# Patient Record
Sex: MALE | Race: BLACK OR AFRICAN AMERICAN | NOT HISPANIC OR LATINO | Employment: OTHER | URBAN - NONMETROPOLITAN AREA
[De-identification: names, ages, dates, MRNs, and addresses within clinical notes are randomized per-mention and may not be internally consistent; named-entity substitution may affect disease eponyms.]

---

## 2022-10-28 ENCOUNTER — DOCUMENTATION (OUTPATIENT)
Dept: FAMILY MEDICINE CLINIC | Facility: CLINIC | Age: 67
End: 2022-10-28

## 2022-10-28 PROBLEM — E11.9 TYPE 2 DIABETES MELLITUS WITHOUT COMPLICATIONS: Status: ACTIVE | Noted: 2022-10-28

## 2022-10-28 PROBLEM — M62.81 MUSCLE WEAKNESS (GENERALIZED): Status: ACTIVE | Noted: 2022-10-28

## 2022-10-28 PROBLEM — F03.90 UNSPECIFIED DEMENTIA, UNSPECIFIED SEVERITY, WITHOUT BEHAVIORAL DISTURBANCE, PSYCHOTIC DISTURBANCE, MOOD DISTURBANCE, AND ANXIETY: Status: ACTIVE | Noted: 2022-10-28

## 2022-10-28 PROBLEM — S06.5X9A: Status: ACTIVE | Noted: 2022-10-28

## 2022-10-28 PROBLEM — I69.354 HEMIPLEGIA AND HEMIPARESIS FOLLOWING CEREBRAL INFARCTION AFFECTING LEFT NON-DOMINANT SIDE: Status: ACTIVE | Noted: 2022-10-28

## 2022-10-28 PROBLEM — K59.00 CONSTIPATION, UNSPECIFIED: Status: ACTIVE | Noted: 2022-10-28

## 2022-10-28 PROBLEM — G93.40 ENCEPHALOPATHY: Status: ACTIVE | Noted: 2022-10-28

## 2022-10-28 PROBLEM — Z86.73 PERSONAL HISTORY OF TRANSIENT ISCHEMIC ATTACK (TIA), AND CEREBRAL INFARCTION WITHOUT RESIDUAL DEFICITS: Status: ACTIVE | Noted: 2022-10-28

## 2022-10-28 PROBLEM — S09.90XD UNSPECIFIED INJURY OF HEAD, SUBSEQUENT ENCOUNTER: Status: ACTIVE | Noted: 2022-10-28

## 2022-10-28 PROBLEM — M62.422 CONTRACTURE OF MUSCLE, LEFT UPPER ARM: Status: ACTIVE | Noted: 2022-10-28

## 2022-10-28 PROBLEM — R26.81 UNSTEADINESS ON FEET: Status: ACTIVE | Noted: 2022-10-28

## 2022-10-28 PROBLEM — E78.5 HYPERLIPIDEMIA: Status: ACTIVE | Noted: 2022-10-28

## 2022-10-28 PROBLEM — E46 UNSPECIFIED PROTEIN-CALORIE MALNUTRITION: Status: ACTIVE | Noted: 2022-10-28

## 2022-10-28 PROBLEM — R55 SYNCOPE AND COLLAPSE: Status: ACTIVE | Noted: 2022-10-28

## 2022-10-28 RX ORDER — PREGABALIN 75 MG/1
75 CAPSULE ORAL 2 TIMES DAILY
COMMUNITY
End: 2022-10-28 | Stop reason: SDUPTHER

## 2022-10-28 RX ORDER — CARVEDILOL 3.12 MG/1
3.12 TABLET ORAL 2 TIMES DAILY WITH MEALS
COMMUNITY

## 2022-10-28 RX ORDER — SENNA PLUS 8.6 MG/1
2 TABLET ORAL DAILY
COMMUNITY

## 2022-10-28 RX ORDER — ATORVASTATIN CALCIUM 80 MG/1
80 TABLET, FILM COATED ORAL DAILY
COMMUNITY

## 2022-10-28 RX ORDER — PREGABALIN 75 MG/1
75 CAPSULE ORAL 2 TIMES DAILY
Qty: 60 CAPSULE | Refills: 3 | Status: SHIPPED | OUTPATIENT
Start: 2022-10-28 | End: 2022-12-28 | Stop reason: SDUPTHER

## 2022-10-28 RX ORDER — VALSARTAN 80 MG/1
80 TABLET ORAL DAILY
COMMUNITY

## 2022-10-28 RX ORDER — INSULIN GLARGINE 100 [IU]/ML
5 INJECTION, SOLUTION SUBCUTANEOUS 2 TIMES DAILY
COMMUNITY

## 2022-10-28 RX ORDER — AMLODIPINE BESYLATE 10 MG/1
10 TABLET ORAL DAILY
COMMUNITY

## 2022-10-28 NOTE — TELEPHONE ENCOUNTER
(NEW ADMIT)    [facility] REQUESTING MED REFILL FOR LYRICA 75 MG.    DIRECTIONS: LYRICA 75 MG 1 CAPSULE PO BID.

## 2022-11-03 ENCOUNTER — NURSING HOME (OUTPATIENT)
Dept: INTERNAL MEDICINE | Facility: CLINIC | Age: 67
End: 2022-11-03

## 2022-11-03 VITALS
DIASTOLIC BLOOD PRESSURE: 78 MMHG | TEMPERATURE: 97.6 F | SYSTOLIC BLOOD PRESSURE: 133 MMHG | RESPIRATION RATE: 18 BRPM | OXYGEN SATURATION: 95 % | WEIGHT: 104 LBS | HEART RATE: 77 BPM

## 2022-11-03 DIAGNOSIS — R26.81 UNSTEADINESS ON FEET: ICD-10-CM

## 2022-11-03 DIAGNOSIS — G93.40 ENCEPHALOPATHY: ICD-10-CM

## 2022-11-03 DIAGNOSIS — M62.81 MUSCLE WEAKNESS (GENERALIZED): ICD-10-CM

## 2022-11-03 DIAGNOSIS — S09.90XD UNSPECIFIED INJURY OF HEAD, SUBSEQUENT ENCOUNTER: ICD-10-CM

## 2022-11-03 DIAGNOSIS — S06.5X9D TRAUMATIC SUBDURAL HEMORRHAGE WITH LOSS OF CONSCIOUSNESS, SUBSEQUENT ENCOUNTER: Primary | ICD-10-CM

## 2022-11-03 DIAGNOSIS — I69.354 HEMIPLEGIA AND HEMIPARESIS FOLLOWING CEREBRAL INFARCTION AFFECTING LEFT NON-DOMINANT SIDE: ICD-10-CM

## 2022-11-03 DIAGNOSIS — E11.9 TYPE 2 DIABETES MELLITUS WITHOUT COMPLICATION, WITH LONG-TERM CURRENT USE OF INSULIN: ICD-10-CM

## 2022-11-03 DIAGNOSIS — Z79.4 TYPE 2 DIABETES MELLITUS WITHOUT COMPLICATION, WITH LONG-TERM CURRENT USE OF INSULIN: ICD-10-CM

## 2022-11-03 DIAGNOSIS — M62.422 CONTRACTURE OF MUSCLE, LEFT UPPER ARM: ICD-10-CM

## 2022-11-03 PROCEDURE — 99305 1ST NF CARE MODERATE MDM 35: CPT | Performed by: INTERNAL MEDICINE

## 2022-11-11 NOTE — PROGRESS NOTES
Nursing Home History and Physical       Natan Ovalle DO  [address] [place] [address] Phone: [phone]  Fax: [phone]     PATIENT NAME: Sher Colindres                                                                          YOB: 1955           DATE OF SERVICE: 11/03/2022  FACILITY:  [facility]    CHIEF COMPLAINT:  Nursing facility admission      HISTORY OF PRESENT ILLNESS:   Patient is a 67-year-old white male with a history of hypertension, hyperlipidemia, dementia, and type 2 diabetes mellitus who was recently hospitalized at [facility] after suffering a mechanical fall from toilet resulting in subdural hematoma.  He already has a known history of left-sided hemiplegia and had been taking aspirin.  Patient was noted to have subdural hematoma on CT imaging which held stable.  Patient was discharged to nursing facility for further rehab and strengthening.    Patient was resting comfortably in his bed with no new complaints.  Neurologic changes.  Nurses report appropriate mood and behaviors.  Left-sided weakness is chronic and persists.  Patient has been working with physical therapy as best as he can.  Patient is due to have his aspirin restarted per neurology recommendations.    PAST MEDICAL & SURGICAL HISTORY:   Past Medical History:   Diagnosis Date   • Diabetes mellitus (HCC)    • Hypertension    • Stroke (HCC)       No past surgical history on file.      MEDICATIONS:  I have reviewed and reconciled the patients medication list in the patients chart at the skilled nursing facility on 11/03/2022.      ALLERGIES:  No Known Allergies      SOCIAL HISTORY:  Social History     Socioeconomic History   • Marital status:    Tobacco Use   • Smoking status: Unknown   Substance and Sexual Activity   • Drug use: Never   • Sexual activity: Defer       FAMILY HISTORY:  Family History   Family history unknown: Yes        REVIEW OF SYSTEMS:  Review of Systems    Constitutional: Negative for chills, fatigue and fever.   HENT: Negative for congestion, ear pain, rhinorrhea, sinus pressure and sore throat.    Eyes: Negative for visual disturbance.   Respiratory: Negative for cough, chest tightness, shortness of breath and wheezing.    Cardiovascular: Negative for chest pain, palpitations and leg swelling.   Gastrointestinal: Negative for abdominal pain, blood in stool, constipation, diarrhea, nausea and vomiting.   Endocrine: Negative for polydipsia and polyuria.   Genitourinary: Negative for dysuria and hematuria.   Musculoskeletal: Negative for arthralgias and back pain.   Skin: Negative for rash.   Neurological: Negative for dizziness, light-headedness, numbness and headaches.   Psychiatric/Behavioral: Negative for dysphoric mood and sleep disturbance. The patient is not nervous/anxious.          PHYSICAL EXAMINATION:   VITAL SIGNS: /78   Pulse 77   Temp 97.6 °F (36.4 °C)   Resp 18   Wt 47.2 kg (104 lb)   SpO2 95%     Physical Exam  Vitals and nursing note reviewed.   Constitutional:       Appearance: Normal appearance. He is well-developed.   HENT:      Head: Normocephalic and atraumatic.      Right Ear: Tympanic membrane and external ear normal.      Left Ear: Tympanic membrane and external ear normal.      Nose: Nose normal. No congestion.      Mouth/Throat:      Mouth: Mucous membranes are moist.      Pharynx: No oropharyngeal exudate.   Eyes:      General: No scleral icterus.        Right eye: No discharge.      Pupils: Pupils are equal, round, and reactive to light.   Neck:      Thyroid: No thyromegaly.      Vascular: No JVD.   Cardiovascular:      Rate and Rhythm: Normal rate and regular rhythm.      Heart sounds: Normal heart sounds. No murmur heard.    No friction rub.   Pulmonary:      Effort: Pulmonary effort is normal. No respiratory distress.      Breath sounds: Normal breath sounds. No stridor. No wheezing.   Abdominal:      General: Bowel sounds  are normal. There is no distension.      Palpations: Abdomen is soft.      Tenderness: There is no abdominal tenderness. There is no guarding.   Genitourinary:     Comments: Deferred  Musculoskeletal:         General: No tenderness. Normal range of motion.      Cervical back: Normal range of motion and neck supple.   Lymphadenopathy:      Cervical: No cervical adenopathy.   Skin:     General: Skin is warm and dry.      Findings: No rash.   Neurological:      Mental Status: He is alert and oriented to person, place, and time.      Cranial Nerves: No cranial nerve deficit.   Psychiatric:         Mood and Affect: Mood normal.         Behavior: Behavior normal.         Thought Content: Thought content normal.         RECORDS REVIEW:   Discharge Summary from [facility] 10/27/2022    ASSESSMENT   Diagnoses and all orders for this visit:    1. Traumatic subdural hemorrhage with loss of consciousness, subsequent encounter (Primary)    2. Encephalopathy    3. Unsteadiness on feet    4. Muscle weakness (generalized)    5. Contracture of muscle, left upper arm    6. Unspecified injury of head, subsequent encounter    7. Hemiplegia and hemiparesis following cerebral infarction affecting left non-dominant side (HCC)    8. Type 2 diabetes mellitus without complication, with long-term current use of insulin (HCC)        PLAN  Traumatic subdural hematoma  - Stable.  Aspirin was on hold however per neurosurgery recommendations, patient can have this restarted today.  Orders have been placed to restart aspirin 81 mg daily.  - No new neurologic changes.    Encephalopathy/unsteadiness  - Continue physical therapy for strengthening.    History of left-sided weakness secondary to CVA  - Stable, chronic issue.    Type 2 diabetes mellitus  - Continue insulin regimen.  Glucose levels have been reasonable in the facility so far since admission.        [x]  Discussed Patient in detail with nursing/staff, addressed all needs today.     [x]   Plan of Care Reviewed   [x]  PT/OT Reviewed   [x]  Order Changes  []  Discharge Plans Reviewed  [x]  Advance Directive on file with Nursing Home.   [x]  POA on file with Nursing Home.    [x]  Code Status listed and reviewed.         Natan Ovalle DO.  11/11/2022      **Part of this note may be an electronic transcription/translation of spoken language to printed text using the Dragon Dictation System.**

## 2022-12-15 ENCOUNTER — NURSING HOME (OUTPATIENT)
Dept: INTERNAL MEDICINE | Facility: CLINIC | Age: 67
End: 2022-12-15

## 2022-12-15 VITALS
HEART RATE: 80 BPM | SYSTOLIC BLOOD PRESSURE: 134 MMHG | TEMPERATURE: 97 F | WEIGHT: 124 LBS | RESPIRATION RATE: 18 BRPM | DIASTOLIC BLOOD PRESSURE: 78 MMHG | OXYGEN SATURATION: 97 %

## 2022-12-15 DIAGNOSIS — R26.81 UNSTEADINESS ON FEET: ICD-10-CM

## 2022-12-15 DIAGNOSIS — S09.90XD UNSPECIFIED INJURY OF HEAD, SUBSEQUENT ENCOUNTER: ICD-10-CM

## 2022-12-15 DIAGNOSIS — M62.81 MUSCLE WEAKNESS (GENERALIZED): ICD-10-CM

## 2022-12-15 DIAGNOSIS — E11.9 TYPE 2 DIABETES MELLITUS WITHOUT COMPLICATION, WITH LONG-TERM CURRENT USE OF INSULIN: ICD-10-CM

## 2022-12-15 DIAGNOSIS — Z79.4 TYPE 2 DIABETES MELLITUS WITHOUT COMPLICATION, WITH LONG-TERM CURRENT USE OF INSULIN: ICD-10-CM

## 2022-12-15 DIAGNOSIS — I69.354 HEMIPLEGIA AND HEMIPARESIS FOLLOWING CEREBRAL INFARCTION AFFECTING LEFT NON-DOMINANT SIDE: ICD-10-CM

## 2022-12-15 DIAGNOSIS — S06.5X9D TRAUMATIC SUBDURAL HEMORRHAGE WITH LOSS OF CONSCIOUSNESS, SUBSEQUENT ENCOUNTER: Primary | ICD-10-CM

## 2022-12-15 DIAGNOSIS — G93.40 ENCEPHALOPATHY: ICD-10-CM

## 2022-12-15 DIAGNOSIS — M62.422 CONTRACTURE OF MUSCLE, LEFT UPPER ARM: ICD-10-CM

## 2022-12-15 PROCEDURE — 99309 SBSQ NF CARE MODERATE MDM 30: CPT | Performed by: INTERNAL MEDICINE

## 2022-12-19 NOTE — PROGRESS NOTES
Nursing Home Progress Note        Natan Vasquez,    [address] [place] [address] Phone: [phone]  Fax: [phone]     PATIENT NAME: Sher Colindres                                                                          YOB: 1955           DATE OF SERVICE: 12/15/2022  FACILITY:   [facility] ______________________________________________________________________     CHIEF COMPLAINT:  Chronic Medical Management      HISTORY OF PRESENT ILLNESS:   Patient has physically been doing better since he has been admitted to this facility over the last month.  He continuously complains about various concerns.  Today he mentioned that he was having right shoulder and neck pain (his good arm).  His left arm continues to be contracted and at times also bothers him.  He requested evaluation of the right arm with xray.  He does not recall injury.  Glucose levels have been modestly elevated.     PAST MEDICAL & SURGICAL HISTORY:   Past Medical History:   Diagnosis Date   • Diabetes mellitus (HCC)    • Hypertension    • Stroke (HCC)       No past surgical history on file.      MEDICATIONS:  I have reviewed and reconciled the patients medication list in the patients chart at the skilled nursing facility today, 12/15/2022.      ALLERGIES:  No Known Allergies      SOCIAL HISTORY:  Social History     Socioeconomic History   • Marital status:    Tobacco Use   • Smoking status: Unknown   Substance and Sexual Activity   • Drug use: Never   • Sexual activity: Defer       FAMILY HISTORY:  Family History   Family history unknown: Yes       REVIEW OF SYSTEMS:  Review of Systems   Constitutional: Negative for chills, fatigue and fever.   HENT: Negative for congestion, ear pain, rhinorrhea, sinus pressure and sore throat.    Eyes: Negative for visual disturbance.   Respiratory: Negative for cough, chest tightness, shortness of breath and wheezing.    Cardiovascular: Negative for chest pain,  palpitations and leg swelling.   Gastrointestinal: Negative for abdominal pain, blood in stool, constipation, diarrhea, nausea and vomiting.   Endocrine: Negative for polydipsia and polyuria.   Genitourinary: Negative for dysuria and hematuria.   Musculoskeletal: Positive for arthralgias and neck pain. Negative for back pain.   Skin: Negative for rash.   Neurological: Negative for dizziness, light-headedness, numbness and headaches.   Psychiatric/Behavioral: Negative for dysphoric mood and sleep disturbance. The patient is not nervous/anxious.           PHYSICAL EXAMINATION:     VITAL SIGNS:  /78   Pulse 80   Temp 97 °F (36.1 °C)   Resp 18   Wt 56.2 kg (124 lb)   SpO2 97%     Physical Exam  Vitals and nursing note reviewed.   Constitutional:       Appearance: Normal appearance. He is well-developed.   HENT:      Head: Normocephalic and atraumatic.      Right Ear: Tympanic membrane and external ear normal.      Left Ear: Tympanic membrane and external ear normal.      Nose: Nose normal. No congestion.      Mouth/Throat:      Mouth: Mucous membranes are moist.      Pharynx: No oropharyngeal exudate.   Eyes:      General: No scleral icterus.        Right eye: No discharge.      Pupils: Pupils are equal, round, and reactive to light.   Neck:      Thyroid: No thyromegaly.      Vascular: No JVD.   Cardiovascular:      Rate and Rhythm: Normal rate and regular rhythm.      Heart sounds: Normal heart sounds. No murmur heard.    No friction rub.   Pulmonary:      Effort: Pulmonary effort is normal. No respiratory distress.      Breath sounds: Normal breath sounds. No stridor. No wheezing.   Abdominal:      General: Bowel sounds are normal. There is no distension.      Palpations: Abdomen is soft.      Tenderness: There is no abdominal tenderness. There is no guarding.   Genitourinary:     Comments: Deferred  Musculoskeletal:         General: No tenderness. Normal range of motion.      Cervical back: Normal range of  motion and neck supple.      Comments: Right shoulder without obvious abnormalities on physical exam.  Some difficulty with raising arm above head.    Left upper extremity contracted.  Left lower extremity weakness   Lymphadenopathy:      Cervical: No cervical adenopathy.   Skin:     General: Skin is warm and dry.      Findings: No rash.   Neurological:      Mental Status: He is alert and oriented to person, place, and time.      Cranial Nerves: No cranial nerve deficit.   Psychiatric:         Mood and Affect: Mood normal.         Behavior: Behavior normal.         Thought Content: Thought content normal.         RECORDS REVIEW:       ASSESSMENT     Diagnoses and all orders for this visit:    1. Traumatic subdural hemorrhage with loss of consciousness, subsequent encounter (Primary)    2. Encephalopathy    3. Unsteadiness on feet    4. Unspecified injury of head, subsequent encounter    5. Contracture of muscle, left upper arm    6. Muscle weakness (generalized)    7. Hemiplegia and hemiparesis following cerebral infarction affecting left non-dominant side (HCC)    8. Type 2 diabetes mellitus without complication, with long-term current use of insulin (HCC)        PLAN  Right shoulder/neck pain  - Obtain x-ray for further evaluation.  I suspect overuse of right shoulder due to left side being contracted.    Left upper extremity contracture  - Patient does complain of pain including into his neck.  - Start Flexeril 5 mg p.o. 3 times daily as needed.    Type 2 diabetes mellitus  -Glucose levels remain modestly elevated.  Increase Lantus to 10 units twice daily.    Traumatic subdural hematoma  - Stable.  No changes since being on aspirin over the last month.    Encephalopathy/unsteadiness  - Continue physical therapy for strengthening.    History of left-sided weakness secondary to CVA  - Stable, chronic issue.          [x]  Discussed Patient in detail with nursing/staff, addressed all needs today.     [x]  Plan of  Care Reviewed   []  PT/OT Reviewed   [x]  Order Changes  []  Discharge Plans Reviewed  [x]  Advance Directive on file with Nursing Home.   [x]  POA on file with Nursing Home.    [x]  Code Status listed and reviewed.     I confirm accuracy of unchanged data/findings including physical exam and plan which have been carried forward from previous visit, as well as I have updated appropriately those that have changed        Natan Ovalle DO.  12/18/2022

## 2022-12-28 RX ORDER — PREGABALIN 75 MG/1
75 CAPSULE ORAL 2 TIMES DAILY
Qty: 60 CAPSULE | Refills: 5 | Status: SHIPPED | OUTPATIENT
Start: 2022-12-28

## 2022-12-28 NOTE — TELEPHONE ENCOUNTER
[facility] REQUESTING MED REFILL FOR LYRICA 75 MG.    DIRECTIONS: LYRICA 75 MG 1 CAP PO BID SCHEDULED.

## 2023-01-19 ENCOUNTER — NURSING HOME (OUTPATIENT)
Dept: INTERNAL MEDICINE | Facility: CLINIC | Age: 68
End: 2023-01-19
Payer: MEDICARE

## 2023-01-19 VITALS — WEIGHT: 123 LBS | DIASTOLIC BLOOD PRESSURE: 79 MMHG | HEART RATE: 88 BPM | SYSTOLIC BLOOD PRESSURE: 126 MMHG

## 2023-01-19 DIAGNOSIS — I69.354 HEMIPLEGIA AND HEMIPARESIS FOLLOWING CEREBRAL INFARCTION AFFECTING LEFT NON-DOMINANT SIDE: ICD-10-CM

## 2023-01-19 DIAGNOSIS — M62.81 MUSCLE WEAKNESS (GENERALIZED): ICD-10-CM

## 2023-01-19 DIAGNOSIS — E11.9 TYPE 2 DIABETES MELLITUS WITHOUT COMPLICATION, WITH LONG-TERM CURRENT USE OF INSULIN: ICD-10-CM

## 2023-01-19 DIAGNOSIS — Z79.4 TYPE 2 DIABETES MELLITUS WITHOUT COMPLICATION, WITH LONG-TERM CURRENT USE OF INSULIN: ICD-10-CM

## 2023-01-19 DIAGNOSIS — R26.81 UNSTEADINESS ON FEET: ICD-10-CM

## 2023-01-19 DIAGNOSIS — G93.40 ENCEPHALOPATHY: ICD-10-CM

## 2023-01-19 DIAGNOSIS — M62.422 CONTRACTURE OF MUSCLE, LEFT UPPER ARM: ICD-10-CM

## 2023-01-19 DIAGNOSIS — S06.5X9D TRAUMATIC SUBDURAL HEMORRHAGE WITH LOSS OF CONSCIOUSNESS, SUBSEQUENT ENCOUNTER: Primary | ICD-10-CM

## 2023-01-19 DIAGNOSIS — S09.90XD UNSPECIFIED INJURY OF HEAD, SUBSEQUENT ENCOUNTER: ICD-10-CM

## 2023-01-19 PROCEDURE — 99308 SBSQ NF CARE LOW MDM 20: CPT | Performed by: INTERNAL MEDICINE

## 2023-01-26 NOTE — PROGRESS NOTES
Nursing Home Progress Note        Natan Ovalle DO   [address] [place] [address] Phone: [phone]  Fax: [phone]     PATIENT NAME: Sher Colindres                                                                          YOB: 1955           DATE OF SERVICE: 01/19/2023  FACILITY:   [facility] ______________________________________________________________________     CHIEF COMPLAINT:  Chronic Medical Management      HISTORY OF PRESENT ILLNESS:   Patient was more conversant today however he appeared very confused and was tangential in his speech.  At times, nurses note that he has been having odd behaviors such as eating with his hands.  Today he was mentioning something about his son but could not be specific.  Shoulder pains do not seem to be an issue as mentioned in the past.     PAST MEDICAL & SURGICAL HISTORY:   Past Medical History:   Diagnosis Date   • Diabetes mellitus (HCC)    • Hypertension    • Stroke (HCC)       No past surgical history on file.      MEDICATIONS:  I have reviewed and reconciled the patients medication list in the patients chart at the skilled nursing facility today, 01/19/2023.      ALLERGIES:  No Known Allergies      SOCIAL HISTORY:  Social History     Socioeconomic History   • Marital status:    Tobacco Use   • Smoking status: Unknown   Substance and Sexual Activity   • Drug use: Never   • Sexual activity: Defer       FAMILY HISTORY:  Family History   Family history unknown: Yes       REVIEW OF SYSTEMS:  Review of Systems   Constitutional: Negative for chills, fatigue and fever.   HENT: Negative for congestion, ear pain, rhinorrhea, sinus pressure and sore throat.    Eyes: Negative for visual disturbance.   Respiratory: Negative for cough, chest tightness, shortness of breath and wheezing.    Cardiovascular: Negative for chest pain, palpitations and leg swelling.   Gastrointestinal: Negative for abdominal pain, blood in stool,  constipation, diarrhea, nausea and vomiting.   Endocrine: Negative for polydipsia and polyuria.   Genitourinary: Negative for dysuria and hematuria.   Musculoskeletal: Negative for arthralgias and back pain.   Skin: Negative for rash.   Neurological: Negative for dizziness, light-headedness, numbness and headaches.   Psychiatric/Behavioral: Negative for dysphoric mood and sleep disturbance. The patient is not nervous/anxious.           PHYSICAL EXAMINATION:     VITAL SIGNS:  /79   Pulse 88   Wt 55.8 kg (123 lb)     Physical Exam  Vitals and nursing note reviewed.   Constitutional:       Appearance: Normal appearance. He is well-developed.   HENT:      Head: Normocephalic and atraumatic.      Right Ear: Tympanic membrane and external ear normal.      Left Ear: Tympanic membrane and external ear normal.      Nose: Nose normal. No congestion.      Mouth/Throat:      Mouth: Mucous membranes are moist.      Pharynx: No oropharyngeal exudate.   Eyes:      General: No scleral icterus.        Right eye: No discharge.      Pupils: Pupils are equal, round, and reactive to light.   Neck:      Thyroid: No thyromegaly.      Vascular: No JVD.   Cardiovascular:      Rate and Rhythm: Normal rate and regular rhythm.      Heart sounds: Normal heart sounds. No murmur heard.    No friction rub.   Pulmonary:      Effort: Pulmonary effort is normal. No respiratory distress.      Breath sounds: Normal breath sounds. No stridor. No wheezing.   Abdominal:      General: Bowel sounds are normal. There is no distension.      Palpations: Abdomen is soft.      Tenderness: There is no abdominal tenderness. There is no guarding.   Genitourinary:     Comments: Deferred  Musculoskeletal:         General: No tenderness. Normal range of motion.      Cervical back: Normal range of motion and neck supple.      Comments: Right shoulder without obvious abnormalities on physical exam.  Some difficulty with raising arm above head.    Left upper  extremity contracted.  Left lower extremity weakness   Lymphadenopathy:      Cervical: No cervical adenopathy.   Skin:     General: Skin is warm and dry.      Findings: No rash.   Neurological:      Mental Status: He is alert and oriented to person, place, and time.      Cranial Nerves: No cranial nerve deficit.   Psychiatric:      Comments: Erratic thoughts, scattered speech         RECORDS REVIEW:       ASSESSMENT     Diagnoses and all orders for this visit:    1. Traumatic subdural hemorrhage with loss of consciousness, subsequent encounter (Primary)    2. Encephalopathy    3. Unsteadiness on feet    4. Muscle weakness (generalized)    5. Contracture of muscle, left upper arm    6. Unspecified injury of head, subsequent encounter    7. Hemiplegia and hemiparesis following cerebral infarction affecting left non-dominant side (HCC)    8. Type 2 diabetes mellitus without complication, with long-term current use of insulin (HCC)        PLAN  Encephalopathy / Dementia  - Confusion is more apparent with behaviors on exam today  - nurses report such behaviors have been typical for the patient.     unsteadiness  - Continue physical therapy for strengthening.    Right shoulder/neck pain  - no complaints from patient recently. Cont to monitor.     Left upper extremity contracture  - Controlled with Flexeril 5 mg p.o. 3 times daily as needed.    Type 2 diabetes mellitus  -Glucose levels improving with occasional adjustments to insulin.     Traumatic subdural hematoma  - Stable.  No changes since being on aspirin over the last month.    History of left-sided weakness secondary to CVA  - Stable, chronic issue.      [x]  Discussed Patient in detail with nursing/staff, addressed all needs today.     [x]  Plan of Care Reviewed   []  PT/OT Reviewed   []  Order Changes  []  Discharge Plans Reviewed  [x]  Advance Directive on file with Nursing Home.   [x]  POA on file with Nursing Home.    [x]  Code Status listed and reviewed.      I confirm accuracy of unchanged data/findings including physical exam and plan which have been carried forward from previous visit, as well as I have updated appropriately those that have changed        Natan Ovalle DO.  1/25/2023

## 2023-03-28 ENCOUNTER — NURSING HOME (OUTPATIENT)
Dept: INTERNAL MEDICINE | Facility: CLINIC | Age: 68
End: 2023-03-28
Payer: MEDICARE

## 2023-03-28 VITALS
SYSTOLIC BLOOD PRESSURE: 158 MMHG | TEMPERATURE: 97.7 F | HEART RATE: 84 BPM | OXYGEN SATURATION: 97 % | WEIGHT: 131.4 LBS | RESPIRATION RATE: 18 BRPM | DIASTOLIC BLOOD PRESSURE: 86 MMHG

## 2023-03-28 DIAGNOSIS — G93.40 ENCEPHALOPATHY: ICD-10-CM

## 2023-03-28 DIAGNOSIS — S09.90XD UNSPECIFIED INJURY OF HEAD, SUBSEQUENT ENCOUNTER: ICD-10-CM

## 2023-03-28 DIAGNOSIS — S06.5X9D TRAUMATIC SUBDURAL HEMORRHAGE WITH LOSS OF CONSCIOUSNESS, SUBSEQUENT ENCOUNTER: Primary | ICD-10-CM

## 2023-03-28 DIAGNOSIS — M62.81 MUSCLE WEAKNESS (GENERALIZED): ICD-10-CM

## 2023-03-28 DIAGNOSIS — E11.9 TYPE 2 DIABETES MELLITUS WITHOUT COMPLICATION, WITH LONG-TERM CURRENT USE OF INSULIN: ICD-10-CM

## 2023-03-28 DIAGNOSIS — Z79.4 TYPE 2 DIABETES MELLITUS WITHOUT COMPLICATION, WITH LONG-TERM CURRENT USE OF INSULIN: ICD-10-CM

## 2023-03-28 DIAGNOSIS — I69.354 HEMIPLEGIA AND HEMIPARESIS FOLLOWING CEREBRAL INFARCTION AFFECTING LEFT NON-DOMINANT SIDE: ICD-10-CM

## 2023-03-28 DIAGNOSIS — R26.81 UNSTEADINESS ON FEET: ICD-10-CM

## 2023-03-28 DIAGNOSIS — M62.422 CONTRACTURE OF MUSCLE, LEFT UPPER ARM: ICD-10-CM

## 2023-03-28 NOTE — LETTER
Nursing Home Progress Note        Natan Ovalle DO   [address] [place] [address] Phone: [phone]  Fax: [phone]     PATIENT NAME: Sher Colindres                                                                          YOB: 1955           DATE OF SERVICE: 03/28/2023  FACILITY:   [facility] ______________________________________________________________________     CHIEF COMPLAINT:  Chronic Medical Management      HISTORY OF PRESENT ILLNESS:   Patient was seen for routine compliance visit.  Left upper extremity remains contracted.  Right foot also has some contraction.  Left upper extremity Doppler studies were done due to concerns of left upper extremity swelling.  No DVT was noted.  His contractures and lack of movement in the extremities are likely resulting in chronic edema and muscle wasting.  Patient otherwise was comfortable and had no other specific complaints or concerns.  Mood and behaviors have been stable recently.    PAST MEDICAL & SURGICAL HISTORY:   Past Medical History:   Diagnosis Date   • Diabetes mellitus    • Hypertension    • Stroke       No past surgical history on file.      MEDICATIONS:  I have reviewed and reconciled the patients medication list in the patients chart at the skilled nursing facility today, 03/28/2023.      ALLERGIES:  No Known Allergies      SOCIAL HISTORY:  Social History     Socioeconomic History   • Marital status:    Tobacco Use   • Smoking status: Unknown   Substance and Sexual Activity   • Drug use: Never   • Sexual activity: Defer       FAMILY HISTORY:  Family History   Family history unknown: Yes       REVIEW OF SYSTEMS:  Review of Systems   Constitutional: Negative for chills, fatigue and fever.   HENT: Negative for congestion, ear pain, rhinorrhea, sinus pressure and sore throat.    Eyes: Negative for visual disturbance.   Respiratory: Negative for cough, chest tightness, shortness of breath and wheezing.     Cardiovascular: Negative for chest pain, palpitations and leg swelling.   Gastrointestinal: Negative for abdominal pain, blood in stool, constipation, diarrhea, nausea and vomiting.   Endocrine: Negative for polydipsia and polyuria.   Genitourinary: Negative for dysuria and hematuria.   Musculoskeletal: Negative for arthralgias and back pain.   Skin: Negative for rash.   Neurological: Negative for dizziness, light-headedness, numbness and headaches.   Psychiatric/Behavioral: Negative for dysphoric mood and sleep disturbance. The patient is not nervous/anxious.           PHYSICAL EXAMINATION:     VITAL SIGNS:  /86   Pulse 84   Temp 97.7 °F (36.5 °C)   Resp 18   Wt 59.6 kg (131 lb 6.4 oz)   SpO2 97%     Physical Exam  Vitals and nursing note reviewed.   Constitutional:       Appearance: Normal appearance. He is well-developed.   HENT:      Head: Normocephalic and atraumatic.      Right Ear: Tympanic membrane and external ear normal.      Left Ear: Tympanic membrane and external ear normal.      Nose: Nose normal. No congestion.      Mouth/Throat:      Mouth: Mucous membranes are moist.      Pharynx: No oropharyngeal exudate.   Eyes:      General: No scleral icterus.        Right eye: No discharge.      Pupils: Pupils are equal, round, and reactive to light.   Neck:      Thyroid: No thyromegaly.      Vascular: No JVD.   Cardiovascular:      Rate and Rhythm: Normal rate and regular rhythm.      Heart sounds: Normal heart sounds. No murmur heard.    No friction rub.   Pulmonary:      Effort: Pulmonary effort is normal. No respiratory distress.      Breath sounds: Normal breath sounds. No stridor. No wheezing.   Abdominal:      General: Bowel sounds are normal. There is no distension.      Palpations: Abdomen is soft.      Tenderness: There is no abdominal tenderness. There is no guarding.   Genitourinary:     Comments: Deferred  Musculoskeletal:         General: No tenderness. Normal range of motion.       Cervical back: Normal range of motion and neck supple.      Comments: Right shoulder without obvious abnormalities on physical exam.  Some difficulty with raising arm above head.    Left upper extremity contracted.  Left lower extremity weakness   Lymphadenopathy:      Cervical: No cervical adenopathy.   Skin:     General: Skin is warm and dry.      Findings: No rash.   Neurological:      Mental Status: He is alert and oriented to person, place, and time.      Cranial Nerves: No cranial nerve deficit.   Psychiatric:      Comments: Erratic thoughts, scattered speech         RECORDS REVIEW:        ASSESSMENT     Diagnoses and all orders for this visit:    1. Traumatic subdural hemorrhage with loss of consciousness, subsequent encounter (Primary)    2. Encephalopathy    3. Unsteadiness on feet    4. Muscle weakness (generalized)    5. Contracture of muscle, left upper arm    6. Type 2 diabetes mellitus without complication, with long-term current use of insulin    7. Hemiplegia and hemiparesis following cerebral infarction affecting left non-dominant side    8. Unspecified injury of head, subsequent encounter        PLAN  Encephalopathy / Dementia  - Confusion remains stable.      unsteadiness  - Continue physical therapy for strengthening.    Right shoulder/neck pain  - no complaints from patient recently. Cont to monitor.     Left upper extremity contracture  - Controlled with Flexeril 5 mg p.o. 3 times daily as needed.    Type 2 diabetes mellitus  -Glucose levels improving with occasional adjustments to insulin.     Traumatic subdural hematoma  - Stable.  No changes since being on aspirin over the last month.    History of left-sided weakness secondary to CVA  - Stable, chronic issue.          [x]  Discussed Patient in detail with nursing/staff, addressed all needs today.     [x]  Plan of Care Reviewed   []  PT/OT Reviewed   []  Order Changes  []  Discharge Plans Reviewed  [x]  Advance Directive on file with Nursing  Home.   [x]  POA on file with Nursing Home.    [x]  Code Status listed and reviewed.     I confirm accuracy of unchanged data/findings including physical exam and plan which have been carried forward from previous visit, as well as I have updated appropriately those that have changed        Natan Ovalle DO.  4/10/2023

## 2023-04-10 NOTE — PROGRESS NOTES
Nursing Home Progress Note        Natan Ovalle DO   [address] [place] [address] Phone: [phone]  Fax: [phone]     PATIENT NAME: Sher Colindres                                                                          YOB: 1955           DATE OF SERVICE: 03/28/2023  FACILITY:   [facility] ______________________________________________________________________     CHIEF COMPLAINT:  Chronic Medical Management      HISTORY OF PRESENT ILLNESS:   Patient was seen for routine compliance visit.  Left upper extremity remains contracted.  Right foot also has some contraction.  Left upper extremity Doppler studies were done due to concerns of left upper extremity swelling.  No DVT was noted.  His contractures and lack of movement in the extremities are likely resulting in chronic edema and muscle wasting.  Patient otherwise was comfortable and had no other specific complaints or concerns.  Mood and behaviors have been stable recently.    PAST MEDICAL & SURGICAL HISTORY:   Past Medical History:   Diagnosis Date   • Diabetes mellitus    • Hypertension    • Stroke       No past surgical history on file.      MEDICATIONS:  I have reviewed and reconciled the patients medication list in the patients chart at the skilled nursing facility today, 03/28/2023.      ALLERGIES:  No Known Allergies      SOCIAL HISTORY:  Social History     Socioeconomic History   • Marital status:    Tobacco Use   • Smoking status: Unknown   Substance and Sexual Activity   • Drug use: Never   • Sexual activity: Defer       FAMILY HISTORY:  Family History   Family history unknown: Yes       REVIEW OF SYSTEMS:  Review of Systems   Constitutional: Negative for chills, fatigue and fever.   HENT: Negative for congestion, ear pain, rhinorrhea, sinus pressure and sore throat.    Eyes: Negative for visual disturbance.   Respiratory: Negative for cough, chest tightness, shortness of breath and wheezing.     Cardiovascular: Negative for chest pain, palpitations and leg swelling.   Gastrointestinal: Negative for abdominal pain, blood in stool, constipation, diarrhea, nausea and vomiting.   Endocrine: Negative for polydipsia and polyuria.   Genitourinary: Negative for dysuria and hematuria.   Musculoskeletal: Negative for arthralgias and back pain.   Skin: Negative for rash.   Neurological: Negative for dizziness, light-headedness, numbness and headaches.   Psychiatric/Behavioral: Negative for dysphoric mood and sleep disturbance. The patient is not nervous/anxious.           PHYSICAL EXAMINATION:     VITAL SIGNS:  /86   Pulse 84   Temp 97.7 °F (36.5 °C)   Resp 18   Wt 59.6 kg (131 lb 6.4 oz)   SpO2 97%     Physical Exam  Vitals and nursing note reviewed.   Constitutional:       Appearance: Normal appearance. He is well-developed.   HENT:      Head: Normocephalic and atraumatic.      Right Ear: Tympanic membrane and external ear normal.      Left Ear: Tympanic membrane and external ear normal.      Nose: Nose normal. No congestion.      Mouth/Throat:      Mouth: Mucous membranes are moist.      Pharynx: No oropharyngeal exudate.   Eyes:      General: No scleral icterus.        Right eye: No discharge.      Pupils: Pupils are equal, round, and reactive to light.   Neck:      Thyroid: No thyromegaly.      Vascular: No JVD.   Cardiovascular:      Rate and Rhythm: Normal rate and regular rhythm.      Heart sounds: Normal heart sounds. No murmur heard.    No friction rub.   Pulmonary:      Effort: Pulmonary effort is normal. No respiratory distress.      Breath sounds: Normal breath sounds. No stridor. No wheezing.   Abdominal:      General: Bowel sounds are normal. There is no distension.      Palpations: Abdomen is soft.      Tenderness: There is no abdominal tenderness. There is no guarding.   Genitourinary:     Comments: Deferred  Musculoskeletal:         General: No tenderness. Normal range of motion.       Cervical back: Normal range of motion and neck supple.      Comments: Right shoulder without obvious abnormalities on physical exam.  Some difficulty with raising arm above head.    Left upper extremity contracted.  Left lower extremity weakness   Lymphadenopathy:      Cervical: No cervical adenopathy.   Skin:     General: Skin is warm and dry.      Findings: No rash.   Neurological:      Mental Status: He is alert and oriented to person, place, and time.      Cranial Nerves: No cranial nerve deficit.   Psychiatric:      Comments: Erratic thoughts, scattered speech         RECORDS REVIEW:        ASSESSMENT     Diagnoses and all orders for this visit:    1. Traumatic subdural hemorrhage with loss of consciousness, subsequent encounter (Primary)    2. Encephalopathy    3. Unsteadiness on feet    4. Muscle weakness (generalized)    5. Contracture of muscle, left upper arm    6. Type 2 diabetes mellitus without complication, with long-term current use of insulin    7. Hemiplegia and hemiparesis following cerebral infarction affecting left non-dominant side    8. Unspecified injury of head, subsequent encounter        PLAN  Encephalopathy / Dementia  - Confusion remains stable.      unsteadiness  - Continue physical therapy for strengthening.    Right shoulder/neck pain  - no complaints from patient recently. Cont to monitor.     Left upper extremity contracture  - Controlled with Flexeril 5 mg p.o. 3 times daily as needed.    Type 2 diabetes mellitus  -Glucose levels improving with occasional adjustments to insulin.     Traumatic subdural hematoma  - Stable.  No changes since being on aspirin over the last month.    History of left-sided weakness secondary to CVA  - Stable, chronic issue.          [x]  Discussed Patient in detail with nursing/staff, addressed all needs today.     [x]  Plan of Care Reviewed   []  PT/OT Reviewed   []  Order Changes  []  Discharge Plans Reviewed  [x]  Advance Directive on file with Nursing  Home.   [x]  POA on file with Nursing Home.    [x]  Code Status listed and reviewed.     I confirm accuracy of unchanged data/findings including physical exam and plan which have been carried forward from previous visit, as well as I have updated appropriately those that have changed        Natan Ovalle DO.  4/10/2023

## 2023-05-23 ENCOUNTER — NURSING HOME (OUTPATIENT)
Dept: INTERNAL MEDICINE | Facility: CLINIC | Age: 68
End: 2023-05-23
Payer: MEDICARE

## 2023-05-23 VITALS
WEIGHT: 147 LBS | SYSTOLIC BLOOD PRESSURE: 144 MMHG | TEMPERATURE: 97.7 F | OXYGEN SATURATION: 97 % | RESPIRATION RATE: 18 BRPM | HEART RATE: 80 BPM | DIASTOLIC BLOOD PRESSURE: 68 MMHG

## 2023-05-23 DIAGNOSIS — M62.81 MUSCLE WEAKNESS (GENERALIZED): ICD-10-CM

## 2023-05-23 DIAGNOSIS — I69.354 HEMIPLEGIA AND HEMIPARESIS FOLLOWING CEREBRAL INFARCTION AFFECTING LEFT NON-DOMINANT SIDE: ICD-10-CM

## 2023-05-23 DIAGNOSIS — M62.422 CONTRACTURE OF MUSCLE, LEFT UPPER ARM: ICD-10-CM

## 2023-05-23 DIAGNOSIS — S06.5X9D TRAUMATIC SUBDURAL HEMORRHAGE WITH LOSS OF CONSCIOUSNESS, SUBSEQUENT ENCOUNTER: Primary | ICD-10-CM

## 2023-05-23 DIAGNOSIS — S91.301D OPEN WOUND OF RIGHT FOOT, SUBSEQUENT ENCOUNTER: ICD-10-CM

## 2023-05-23 DIAGNOSIS — E11.9 TYPE 2 DIABETES MELLITUS WITHOUT COMPLICATION, WITH LONG-TERM CURRENT USE OF INSULIN: ICD-10-CM

## 2023-05-23 DIAGNOSIS — R26.81 UNSTEADINESS ON FEET: ICD-10-CM

## 2023-05-23 DIAGNOSIS — Z79.4 TYPE 2 DIABETES MELLITUS WITHOUT COMPLICATION, WITH LONG-TERM CURRENT USE OF INSULIN: ICD-10-CM

## 2023-05-23 DIAGNOSIS — G93.40 ENCEPHALOPATHY: ICD-10-CM

## 2023-05-23 NOTE — PROGRESS NOTES
Nursing Home Progress Note        Natan Ovalle DO   [address] [place] [address] Phone: [phone]  Fax: [phone]     PATIENT NAME: Sher Colindres                                                                          YOB: 1955           DATE OF SERVICE: 05/23/2023  FACILITY:   [facility] ______________________________________________________________________     CHIEF COMPLAINT:  Chronic Medical Management      HISTORY OF PRESENT ILLNESS:   Patient was seen for routine chronic medical management.  His toe wound has healed well as he continues to follow with wound care services in the facility.  He had also been dealing with a callused chronic wound on the anterior proximal portion of his ankle/foot.  This has been progressively improving.  This likely developed from rubbing from the top of his crocs shoes.    During the time of the exam, patient was trying to use his right hand to eat an oatmeal pie cookie with a spoon.  He was reminded that he could eat the cookie by holding it in his hands.    Patient has also developed a left shoulder pressure wound which was bandaged on exam.  It appears that he has been sleeping on this side or may have rubbed it up against a bed rail.    As far as mood and behaviors, patient has been fairly stable.  Glucose levels have been in fair control.  Blood pressure has been reasonable over the last few months.    PAST MEDICAL & SURGICAL HISTORY:   Past Medical History:   Diagnosis Date   • Diabetes mellitus    • Hypertension    • Stroke       No past surgical history on file.      MEDICATIONS:  I have reviewed and reconciled the patients medication list in the patients chart at the skilled nursing facility today, 05/23/2023.      ALLERGIES:  No Known Allergies      SOCIAL HISTORY:  Social History     Socioeconomic History   • Marital status:    Tobacco Use   • Smoking status: Unknown   Substance and Sexual Activity   • Drug use:  Never   • Sexual activity: Defer       FAMILY HISTORY:  Family History   Family history unknown: Yes       REVIEW OF SYSTEMS:  Review of Systems   Constitutional: Negative for chills, fatigue and fever.   HENT: Negative for congestion, ear pain, rhinorrhea, sinus pressure and sore throat.    Eyes: Negative for visual disturbance.   Respiratory: Negative for cough, chest tightness, shortness of breath and wheezing.    Cardiovascular: Negative for chest pain, palpitations and leg swelling.   Gastrointestinal: Negative for abdominal pain, blood in stool, constipation, diarrhea, nausea and vomiting.   Endocrine: Negative for polydipsia and polyuria.   Genitourinary: Negative for dysuria and hematuria.   Musculoskeletal: Negative for arthralgias and back pain.   Skin: Negative for rash.   Neurological: Negative for dizziness, light-headedness, numbness and headaches.   Psychiatric/Behavioral: Negative for dysphoric mood and sleep disturbance. The patient is not nervous/anxious.           PHYSICAL EXAMINATION:     VITAL SIGNS:  /68   Pulse 80   Temp 97.7 °F (36.5 °C)   Resp 18   Wt 66.7 kg (147 lb)   SpO2 97%     Physical Exam  Vitals and nursing note reviewed.   Constitutional:       Appearance: Normal appearance. He is well-developed.   HENT:      Head: Normocephalic and atraumatic.      Right Ear: Tympanic membrane and external ear normal.      Left Ear: Tympanic membrane and external ear normal.      Nose: Nose normal. No congestion.      Mouth/Throat:      Mouth: Mucous membranes are moist.      Pharynx: No oropharyngeal exudate.   Eyes:      General: No scleral icterus.        Right eye: No discharge.      Pupils: Pupils are equal, round, and reactive to light.   Neck:      Thyroid: No thyromegaly.      Vascular: No JVD.   Cardiovascular:      Rate and Rhythm: Normal rate and regular rhythm.      Heart sounds: Normal heart sounds. No murmur heard.    No friction rub.   Pulmonary:      Effort: Pulmonary  effort is normal. No respiratory distress.      Breath sounds: Normal breath sounds. No stridor. No wheezing.   Abdominal:      General: Bowel sounds are normal. There is no distension.      Palpations: Abdomen is soft.      Tenderness: There is no abdominal tenderness. There is no guarding.   Genitourinary:     Comments: Deferred  Musculoskeletal:         General: No tenderness. Normal range of motion.      Cervical back: Normal range of motion and neck supple.      Comments: Right shoulder without obvious abnormalities on physical exam.  Some difficulty with raising arm above head.    Left upper extremity contracted.  Left lower extremity weakness   Lymphadenopathy:      Cervical: No cervical adenopathy.   Skin:     General: Skin is warm and dry.      Findings: No rash.   Neurological:      Mental Status: He is alert and oriented to person, place, and time.      Cranial Nerves: No cranial nerve deficit.   Psychiatric:      Comments: Erratic thoughts, scattered speech         RECORDS REVIEW:        ASSESSMENT     Diagnoses and all orders for this visit:    1. Traumatic subdural hemorrhage with loss of consciousness, subsequent encounter (Primary)    2. Encephalopathy    3. Muscle weakness (generalized)    4. Type 2 diabetes mellitus without complication, with long-term current use of insulin    5. Hemiplegia and hemiparesis following cerebral infarction affecting left non-dominant side    6. Unsteadiness on feet    7. Contracture of muscle, left upper arm    8. Open wound of right foot, subsequent encounter        PLAN  Encephalopathy / Dementia  - Confusion remains stable.  Continue nursing care for redirecting as needed and supportive care.    Right foot wound / Toe wound  - wound care is following for management.     Left shoulder pressure wound  - New finding.  Continue bandaging and monitoring wound.  This is about 1 cm round.    unsteadiness  -Remains wheelchair-bound.  Restorative therapy following.    Left  upper extremity contracture  - Controlled with Flexeril 5 mg p.o. 3 times daily as needed.    Type 2 diabetes mellitus  -Glucose levels improving with occasional adjustments to insulin.     Traumatic subdural hematoma  - Stable. May continue aspirin for CVA ppx.     History of left-sided weakness secondary to CVA  - Stable, chronic issue.    Right shoulder/neck pain  - no complaints from patient recently. Cont to monitor.       27 min spent with direct patient care, review of medical chart, discussion with nursing staff, and medical decision making.       [x]  Discussed Patient in detail with nursing/staff, addressed all needs today.     [x]  Plan of Care Reviewed   []  PT/OT Reviewed   [x]  Order Changes  []  Discharge Plans Reviewed  [x]  Advance Directive on file with Nursing Home.   [x]  POA on file with Nursing Home.    [x]  Code Status listed and reviewed.     I confirm accuracy of unchanged data/findings including physical exam and plan which have been carried forward from previous visit, as well as I have updated appropriately those that have changed        Natan Ovalle DO.  5/23/2023

## 2023-05-23 NOTE — LETTER
Nursing Home Progress Note        Natan Ovalle DO   [address] [place] [address] Phone: [phone]  Fax: [phone]     PATIENT NAME: Sher Colindres                                                                          YOB: 1955           DATE OF SERVICE: 05/23/2023  FACILITY:   [facility] ______________________________________________________________________     CHIEF COMPLAINT:  Chronic Medical Management      HISTORY OF PRESENT ILLNESS:   Patient was seen for routine chronic medical management.  His toe wound has healed well as he continues to follow with wound care services in the facility.  He had also been dealing with a callused chronic wound on the anterior proximal portion of his ankle/foot.  This has been progressively improving.  This likely developed from rubbing from the top of his crocs shoes.    During the time of the exam, patient was trying to use his right hand to eat an oatmeal pie cookie with a spoon.  He was reminded that he could eat the cookie by holding it in his hands.    Patient has also developed a left shoulder pressure wound which was bandaged on exam.  It appears that he has been sleeping on this side or may have rubbed it up against a bed rail.    As far as mood and behaviors, patient has been fairly stable.  Glucose levels have been in fair control.  Blood pressure has been reasonable over the last few months.    PAST MEDICAL & SURGICAL HISTORY:   Past Medical History:   Diagnosis Date    Diabetes mellitus     Hypertension     Stroke       No past surgical history on file.      MEDICATIONS:  I have reviewed and reconciled the patients medication list in the patients chart at the skilled nursing facility today, 05/23/2023.      ALLERGIES:  No Known Allergies      SOCIAL HISTORY:  Social History     Socioeconomic History    Marital status:    Tobacco Use    Smoking status: Unknown   Substance and Sexual Activity    Drug use: Never     Sexual activity: Defer       FAMILY HISTORY:  Family History   Family history unknown: Yes       REVIEW OF SYSTEMS:  Review of Systems   Constitutional: Negative for chills, fatigue and fever.   HENT: Negative for congestion, ear pain, rhinorrhea, sinus pressure and sore throat.    Eyes: Negative for visual disturbance.   Respiratory: Negative for cough, chest tightness, shortness of breath and wheezing.    Cardiovascular: Negative for chest pain, palpitations and leg swelling.   Gastrointestinal: Negative for abdominal pain, blood in stool, constipation, diarrhea, nausea and vomiting.   Endocrine: Negative for polydipsia and polyuria.   Genitourinary: Negative for dysuria and hematuria.   Musculoskeletal: Negative for arthralgias and back pain.   Skin: Negative for rash.   Neurological: Negative for dizziness, light-headedness, numbness and headaches.   Psychiatric/Behavioral: Negative for dysphoric mood and sleep disturbance. The patient is not nervous/anxious.           PHYSICAL EXAMINATION:     VITAL SIGNS:  /68   Pulse 80   Temp 97.7 °F (36.5 °C)   Resp 18   Wt 66.7 kg (147 lb)   SpO2 97%     Physical Exam  Vitals and nursing note reviewed.   Constitutional:       Appearance: Normal appearance. He is well-developed.   HENT:      Head: Normocephalic and atraumatic.      Right Ear: Tympanic membrane and external ear normal.      Left Ear: Tympanic membrane and external ear normal.      Nose: Nose normal. No congestion.      Mouth/Throat:      Mouth: Mucous membranes are moist.      Pharynx: No oropharyngeal exudate.   Eyes:      General: No scleral icterus.        Right eye: No discharge.      Pupils: Pupils are equal, round, and reactive to light.   Neck:      Thyroid: No thyromegaly.      Vascular: No JVD.   Cardiovascular:      Rate and Rhythm: Normal rate and regular rhythm.      Heart sounds: Normal heart sounds. No murmur heard.    No friction rub.   Pulmonary:      Effort: Pulmonary effort is  normal. No respiratory distress.      Breath sounds: Normal breath sounds. No stridor. No wheezing.   Abdominal:      General: Bowel sounds are normal. There is no distension.      Palpations: Abdomen is soft.      Tenderness: There is no abdominal tenderness. There is no guarding.   Genitourinary:     Comments: Deferred  Musculoskeletal:         General: No tenderness. Normal range of motion.      Cervical back: Normal range of motion and neck supple.      Comments: Right shoulder without obvious abnormalities on physical exam.  Some difficulty with raising arm above head.    Left upper extremity contracted.  Left lower extremity weakness   Lymphadenopathy:      Cervical: No cervical adenopathy.   Skin:     General: Skin is warm and dry.      Findings: No rash.   Neurological:      Mental Status: He is alert and oriented to person, place, and time.      Cranial Nerves: No cranial nerve deficit.   Psychiatric:      Comments: Erratic thoughts, scattered speech         RECORDS REVIEW:        ASSESSMENT     Diagnoses and all orders for this visit:    1. Traumatic subdural hemorrhage with loss of consciousness, subsequent encounter (Primary)    2. Encephalopathy    3. Muscle weakness (generalized)    4. Type 2 diabetes mellitus without complication, with long-term current use of insulin    5. Hemiplegia and hemiparesis following cerebral infarction affecting left non-dominant side    6. Unsteadiness on feet    7. Contracture of muscle, left upper arm    8. Open wound of right foot, subsequent encounter        PLAN  Encephalopathy / Dementia  - Confusion remains stable.  Continue nursing care for redirecting as needed and supportive care.    Right foot wound / Toe wound  - wound care is following for management.     Left shoulder pressure wound  - New finding.  Continue bandaging and monitoring wound.  This is about 1 cm round.    unsteadiness  -Remains wheelchair-bound.  Restorative therapy following.    Left upper  extremity contracture  - Controlled with Flexeril 5 mg p.o. 3 times daily as needed.    Type 2 diabetes mellitus  -Glucose levels improving with occasional adjustments to insulin.     Traumatic subdural hematoma  - Stable. May continue aspirin for CVA ppx.     History of left-sided weakness secondary to CVA  - Stable, chronic issue.    Right shoulder/neck pain  - no complaints from patient recently. Cont to monitor.       27 min spent with direct patient care, review of medical chart, discussion with nursing staff, and medical decision making.       [x]  Discussed Patient in detail with nursing/staff, addressed all needs today.     [x]  Plan of Care Reviewed   []  PT/OT Reviewed   [x]  Order Changes  []  Discharge Plans Reviewed  [x]  Advance Directive on file with Nursing Home.   [x]  POA on file with Nursing Home.    [x]  Code Status listed and reviewed.     I confirm accuracy of unchanged data/findings including physical exam and plan which have been carried forward from previous visit, as well as I have updated appropriately those that have changed        Natan Ovalle DO.  5/23/2023

## 2023-06-01 ENCOUNTER — NURSING HOME (OUTPATIENT)
Dept: INTERNAL MEDICINE | Facility: CLINIC | Age: 68
End: 2023-06-01
Payer: MEDICARE

## 2023-06-01 DIAGNOSIS — Z79.4 TYPE 2 DIABETES MELLITUS WITHOUT COMPLICATION, WITH LONG-TERM CURRENT USE OF INSULIN: ICD-10-CM

## 2023-06-01 DIAGNOSIS — I69.354 HEMIPLEGIA AND HEMIPARESIS FOLLOWING CEREBRAL INFARCTION AFFECTING LEFT NON-DOMINANT SIDE: ICD-10-CM

## 2023-06-01 DIAGNOSIS — E11.9 TYPE 2 DIABETES MELLITUS WITHOUT COMPLICATION, WITH LONG-TERM CURRENT USE OF INSULIN: ICD-10-CM

## 2023-06-01 DIAGNOSIS — N17.9 AKI (ACUTE KIDNEY INJURY): ICD-10-CM

## 2023-06-01 DIAGNOSIS — M62.422 CONTRACTURE OF MUSCLE, LEFT UPPER ARM: ICD-10-CM

## 2023-06-01 DIAGNOSIS — G93.40 ENCEPHALOPATHY: ICD-10-CM

## 2023-06-01 DIAGNOSIS — R53.83 LETHARGY: Primary | ICD-10-CM

## 2023-06-01 DIAGNOSIS — S06.5X9D TRAUMATIC SUBDURAL HEMORRHAGE WITH LOSS OF CONSCIOUSNESS, SUBSEQUENT ENCOUNTER: ICD-10-CM

## 2023-06-01 DIAGNOSIS — M62.81 MUSCLE WEAKNESS (GENERALIZED): ICD-10-CM

## 2023-06-01 NOTE — LETTER
"  Nursing Home Progress Note        Natan Vasquez,    [address] [place] [address] Phone: [phone]  Fax: [phone]     PATIENT NAME: Sher Colindres                                                                          YOB: 1955           DATE OF SERVICE: 06/01/2023  FACILITY:   [facility] ______________________________________________________________________     CHIEF COMPLAINT:  Chronic Medical Management      HISTORY OF PRESENT ILLNESS:   Patient is a at the request of nursing staff in the facility due to concerns of increased lethargy.  Patient has been \"sleepy all the time\" despite sleeping through the night.  He has known history of periodic supplements before but it seems to be more frequent.  In the facility, nurse practitioner did start work-up.  Labs revealed creatinine of 1.7, GFR 36, BUN of 25, ammonia of 84, vitamin D undetectable, and TSH in normal range.    On exam today, patient was in a bad mood and stated that he wanted me and the nurse to go over it.  Nurse does not recall having any encounters like this before with him.    PAST MEDICAL & SURGICAL HISTORY:   Past Medical History:   Diagnosis Date    Diabetes mellitus     Hypertension     Stroke       No past surgical history on file.      MEDICATIONS:  I have reviewed and reconciled the patients medication list in the patients chart at the skilled nursing facility today, 06/01/2023.      ALLERGIES:  No Known Allergies      SOCIAL HISTORY:  Social History     Socioeconomic History    Marital status:    Tobacco Use    Smoking status: Unknown   Substance and Sexual Activity    Drug use: Never    Sexual activity: Defer       FAMILY HISTORY:  Family History   Family history unknown: Yes       REVIEW OF SYSTEMS:  Review of Systems   Constitutional:  Negative for chills, fatigue and fever.   HENT:  Negative for congestion, ear pain, rhinorrhea, sinus pressure and sore throat.    Eyes:  Negative for " visual disturbance.   Respiratory:  Negative for cough, chest tightness, shortness of breath and wheezing.    Cardiovascular:  Negative for chest pain, palpitations and leg swelling.   Gastrointestinal:  Negative for abdominal pain, blood in stool, constipation, diarrhea, nausea and vomiting.   Endocrine: Negative for polydipsia and polyuria.   Genitourinary:  Negative for dysuria and hematuria.   Musculoskeletal:  Negative for arthralgias and back pain.   Skin:  Negative for rash.   Neurological:  Negative for dizziness, light-headedness, numbness and headaches.   Psychiatric/Behavioral:  Negative for dysphoric mood and sleep disturbance. The patient is not nervous/anxious.         PHYSICAL EXAMINATION:     VITAL SIGNS:  /64   Pulse 72     Physical Exam  Vitals and nursing note reviewed.   Constitutional:       Appearance: Normal appearance. He is well-developed.   HENT:      Head: Normocephalic and atraumatic.      Right Ear: Tympanic membrane and external ear normal.      Left Ear: Tympanic membrane and external ear normal.      Nose: Nose normal. No congestion.      Mouth/Throat:      Mouth: Mucous membranes are moist.      Pharynx: No oropharyngeal exudate.   Eyes:      General: No scleral icterus.        Right eye: No discharge.      Pupils: Pupils are equal, round, and reactive to light.   Neck:      Thyroid: No thyromegaly.      Vascular: No JVD.   Cardiovascular:      Rate and Rhythm: Normal rate and regular rhythm.      Heart sounds: Normal heart sounds. No murmur heard.    No friction rub.   Pulmonary:      Effort: Pulmonary effort is normal. No respiratory distress.      Breath sounds: Normal breath sounds. No stridor. No wheezing.   Abdominal:      General: Bowel sounds are normal. There is no distension.      Palpations: Abdomen is soft.      Tenderness: There is no abdominal tenderness. There is no guarding.   Genitourinary:     Comments: Deferred  Musculoskeletal:         General: No  tenderness. Normal range of motion.      Cervical back: Normal range of motion and neck supple.      Comments: Right shoulder without obvious abnormalities on physical exam.  Some difficulty with raising arm above head.    Left upper extremity contracted.  Left lower extremity weakness   Lymphadenopathy:      Cervical: No cervical adenopathy.   Skin:     General: Skin is warm and dry.      Findings: No rash.   Neurological:      Mental Status: He is alert and oriented to person, place, and time.      Cranial Nerves: No cranial nerve deficit.   Psychiatric:      Comments: Erratic thoughts, scattered speech       RECORDS REVIEW:   Labs 6/1/2023 CBC, CMP, ammonia, vitamin D levels reviewed.  Creatinine elevated at 1.7    ASSESSMENT     Diagnoses and all orders for this visit:    1. Lethargy (Primary)    2. KIRSTEN (acute kidney injury)    3. Encephalopathy    4. Type 2 diabetes mellitus without complication, with long-term current use of insulin    5. Muscle weakness (generalized)    6. Traumatic subdural hemorrhage with loss of consciousness, subsequent encounter    7. Hemiplegia and hemiparesis following cerebral infarction affecting left non-dominant side    8. Contracture of muscle, left upper arm        PLAN  Lethargy/KIRSTEN  -Patient appears euvolemic and I do not believe giving IV fluids would be very helpful.  We will encourage oral intake of fluids.  - KIRSTEN may be causing lethargy in the setting of Lyrica.  We will discontinue Lyrica for now.  -Check A1c, CBC, BMP on Monday  - Obtain renal ultrasound as recent labs may suggest KIRSTEN is less likely a result of dehydration.    Dementia  - Patient continues to benefit from nursing care for all ADLs.     Right foot wound / Toe wound  - wound care is following for management.     Left shoulder pressure wound  - New finding.  Continue bandaging and monitoring wound.  This is about 1 cm round.    unsteadiness  -Remains wheelchair-bound.  Restorative therapy following.    Left  upper extremity contracture  - Controlled with Flexeril 5 mg p.o. 3 times daily as needed.    Type 2 diabetes mellitus  -Glucose levels improving with occasional adjustments to insulin.     Traumatic subdural hematoma  - Stable. May continue aspirin for CVA ppx.     History of left-sided weakness secondary to CVA  - Stable, chronic issue.    Right shoulder/neck pain  - no complaints from patient recently. Cont to monitor.           [x]  Discussed Patient in detail with nursing/staff, addressed all needs today.     [x]  Plan of Care Reviewed   []  PT/OT Reviewed   [x]  Order Changes  []  Discharge Plans Reviewed  [x]  Advance Directive on file with Nursing Home.   [x]  POA on file with Nursing Home.    [x]  Code Status listed and reviewed.     I confirm accuracy of unchanged data/findings including physical exam and plan which have been carried forward from previous visit, as well as I have updated appropriately those that have changed        Natan Ovalle DO.  6/5/2023

## 2023-06-02 VITALS — HEART RATE: 72 BPM | SYSTOLIC BLOOD PRESSURE: 121 MMHG | DIASTOLIC BLOOD PRESSURE: 64 MMHG

## 2023-06-05 NOTE — PROGRESS NOTES
"  Nursing Home Progress Note        Natan Vasquez,    [address] [place] [address] Phone: [phone]  Fax: [phone]     PATIENT NAME: Sher Colindres                                                                          YOB: 1955           DATE OF SERVICE: 06/01/2023  [facility]ACILITY[facility]   [facility] ______________________________________________________________________     CHIEF COMPLAINT:  Chronic Medical Management      HISTORY OF PRESENT ILLNESS:   Patient is a at the request of nursing staff in the facility due to concerns of increased lethargy.  Patient has been \"sleepy all the time\" despite sleeping through the night.  He has known history of periodic supplements before but it seems to be more frequent.  In the facility, nurse practitioner did start work-up.  Labs revealed creatinine of 1.7, GFR 36, BUN of 25, ammonia of 84, vitamin D undetectable, and TSH in normal range.    On exam today, patient was in a bad mood and stated that he wanted me and the nurse to go over it.  Nurse does not recall having any encounters like this before with him.    PAST MEDICAL & SURGICAL HISTORY:   Past Medical History:   Diagnosis Date    Diabetes mellitus     Hypertension     Stroke       No past surgical history on file.      MEDICATIONS:  I have reviewed and reconciled the patients medication list in the patients chart at the skilled nursing facility today, 06/01/2023.      ALLERGIES:  No Known Allergies      SOCIAL HISTORY:  Social History     Socioeconomic History    Marital status:    Tobacco Use    Smoking status: Unknown   Substance and Sexual Activity    Drug use: Never    Sexual activity: Defer       FAMILY HISTORY:  Family History   Family history unknown: Yes       REVIEW OF SYSTEMS:  Review of Systems   Constitutional:  Negative for chills, fatigue and fever.   HENT:  Negative for congestion, ear pain, rhinorrhea, sinus pressure and sore throat.    Eyes:  Negative for " visual disturbance.   Respiratory:  Negative for cough, chest tightness, shortness of breath and wheezing.    Cardiovascular:  Negative for chest pain, palpitations and leg swelling.   Gastrointestinal:  Negative for abdominal pain, blood in stool, constipation, diarrhea, nausea and vomiting.   Endocrine: Negative for polydipsia and polyuria.   Genitourinary:  Negative for dysuria and hematuria.   Musculoskeletal:  Negative for arthralgias and back pain.   Skin:  Negative for rash.   Neurological:  Negative for dizziness, light-headedness, numbness and headaches.   Psychiatric/Behavioral:  Negative for dysphoric mood and sleep disturbance. The patient is not nervous/anxious.         PHYSICAL EXAMINATION:     VITAL SIGNS:  /64   Pulse 72     Physical Exam  Vitals and nursing note reviewed.   Constitutional:       Appearance: Normal appearance. He is well-developed.   HENT:      Head: Normocephalic and atraumatic.      Right Ear: Tympanic membrane and external ear normal.      Left Ear: Tympanic membrane and external ear normal.      Nose: Nose normal. No congestion.      Mouth/Throat:      Mouth: Mucous membranes are moist.      Pharynx: No oropharyngeal exudate.   Eyes:      General: No scleral icterus.        Right eye: No discharge.      Pupils: Pupils are equal, round, and reactive to light.   Neck:      Thyroid: No thyromegaly.      Vascular: No JVD.   Cardiovascular:      Rate and Rhythm: Normal rate and regular rhythm.      Heart sounds: Normal heart sounds. No murmur heard.    No friction rub.   Pulmonary:      Effort: Pulmonary effort is normal. No respiratory distress.      Breath sounds: Normal breath sounds. No stridor. No wheezing.   Abdominal:      General: Bowel sounds are normal. There is no distension.      Palpations: Abdomen is soft.      Tenderness: There is no abdominal tenderness. There is no guarding.   Genitourinary:     Comments: Deferred  Musculoskeletal:         General: No  tenderness. Normal range of motion.      Cervical back: Normal range of motion and neck supple.      Comments: Right shoulder without obvious abnormalities on physical exam.  Some difficulty with raising arm above head.    Left upper extremity contracted.  Left lower extremity weakness   Lymphadenopathy:      Cervical: No cervical adenopathy.   Skin:     General: Skin is warm and dry.      Findings: No rash.   Neurological:      Mental Status: He is alert and oriented to person, place, and time.      Cranial Nerves: No cranial nerve deficit.   Psychiatric:      Comments: Erratic thoughts, scattered speech       RECORDS REVIEW:   Labs 6/1/2023 CBC, CMP, ammonia, vitamin D levels reviewed.  Creatinine elevated at 1.7    ASSESSMENT     Diagnoses and all orders for this visit:    1. Lethargy (Primary)    2. KIRSTEN (acute kidney injury)    3. Encephalopathy    4. Type 2 diabetes mellitus without complication, with long-term current use of insulin    5. Muscle weakness (generalized)    6. Traumatic subdural hemorrhage with loss of consciousness, subsequent encounter    7. Hemiplegia and hemiparesis following cerebral infarction affecting left non-dominant side    8. Contracture of muscle, left upper arm        PLAN  Lethargy/KIRSTEN  -Patient appears euvolemic and I do not believe giving IV fluids would be very helpful.  We will encourage oral intake of fluids.  - KIRSTEN may be causing lethargy in the setting of Lyrica.  We will discontinue Lyrica for now.  -Check A1c, CBC, BMP on Monday  - Obtain renal ultrasound as recent labs may suggest KIRSTEN is less likely a result of dehydration.    Dementia  - Patient continues to benefit from nursing care for all ADLs.     Right foot wound / Toe wound  - wound care is following for management.     Left shoulder pressure wound  - New finding.  Continue bandaging and monitoring wound.  This is about 1 cm round.    unsteadiness  -Remains wheelchair-bound.  Restorative therapy following.    Left  upper extremity contracture  - Controlled with Flexeril 5 mg p.o. 3 times daily as needed.    Type 2 diabetes mellitus  -Glucose levels improving with occasional adjustments to insulin.     Traumatic subdural hematoma  - Stable. May continue aspirin for CVA ppx.     History of left-sided weakness secondary to CVA  - Stable, chronic issue.    Right shoulder/neck pain  - no complaints from patient recently. Cont to monitor.           [x]  Discussed Patient in detail with nursing/staff, addressed all needs today.     [x]  Plan of Care Reviewed   []  PT/OT Reviewed   [x]  Order Changes  []  Discharge Plans Reviewed  [x]  Advance Directive on file with Nursing Home.   [x]  POA on file with Nursing Home.    [x]  Code Status listed and reviewed.     I confirm accuracy of unchanged data/findings including physical exam and plan which have been carried forward from previous visit, as well as I have updated appropriately those that have changed        Natan Ovalle DO.  6/5/2023

## 2023-07-31 ENCOUNTER — NURSING HOME (OUTPATIENT)
Dept: INTERNAL MEDICINE | Facility: CLINIC | Age: 68
End: 2023-07-31
Payer: MEDICARE

## 2023-07-31 VITALS
HEART RATE: 70 BPM | OXYGEN SATURATION: 97 % | SYSTOLIC BLOOD PRESSURE: 136 MMHG | RESPIRATION RATE: 18 BRPM | WEIGHT: 136.4 LBS | DIASTOLIC BLOOD PRESSURE: 74 MMHG | TEMPERATURE: 97.6 F

## 2023-07-31 DIAGNOSIS — G93.40 ENCEPHALOPATHY: Primary | ICD-10-CM

## 2023-07-31 DIAGNOSIS — M62.422 CONTRACTURE OF MUSCLE, LEFT UPPER ARM: ICD-10-CM

## 2023-07-31 DIAGNOSIS — I10 ESSENTIAL HYPERTENSION: ICD-10-CM

## 2023-07-31 DIAGNOSIS — S06.5X9D TRAUMATIC SUBDURAL HEMORRHAGE WITH LOSS OF CONSCIOUSNESS, SUBSEQUENT ENCOUNTER: ICD-10-CM

## 2023-07-31 DIAGNOSIS — I69.354 HEMIPLEGIA AND HEMIPARESIS FOLLOWING CEREBRAL INFARCTION AFFECTING LEFT NON-DOMINANT SIDE: ICD-10-CM

## 2023-07-31 DIAGNOSIS — N18.31 STAGE 3A CHRONIC KIDNEY DISEASE: ICD-10-CM

## 2023-07-31 DIAGNOSIS — Z79.4 TYPE 2 DIABETES MELLITUS WITHOUT COMPLICATION, WITH LONG-TERM CURRENT USE OF INSULIN: ICD-10-CM

## 2023-07-31 DIAGNOSIS — E11.9 TYPE 2 DIABETES MELLITUS WITHOUT COMPLICATION, WITH LONG-TERM CURRENT USE OF INSULIN: ICD-10-CM

## 2023-07-31 PROCEDURE — 99308 SBSQ NF CARE LOW MDM 20: CPT | Performed by: INTERNAL MEDICINE
